# Patient Record
Sex: FEMALE | Race: OTHER | URBAN - METROPOLITAN AREA
[De-identification: names, ages, dates, MRNs, and addresses within clinical notes are randomized per-mention and may not be internally consistent; named-entity substitution may affect disease eponyms.]

---

## 2017-08-07 ENCOUNTER — IMPORTED ENCOUNTER (OUTPATIENT)
Dept: URBAN - METROPOLITAN AREA CLINIC 9 | Facility: CLINIC | Age: 82
End: 2017-08-07

## 2018-10-18 ENCOUNTER — IMPORTED ENCOUNTER (OUTPATIENT)
Dept: URBAN - METROPOLITAN AREA CLINIC 9 | Facility: CLINIC | Age: 83
End: 2018-10-18

## 2018-12-20 NOTE — PATIENT DISCUSSION
Pt edu edema improved from a year ago. However pt feels that [place] is decreased since last visit. Injection vs Topical treatment discussed. Recommend least invasive first. Recommend starting BromSite 1 gtt Qday and will follow back up in 4 weeks. Advised pt to call with any vision changes.

## 2018-12-20 NOTE — PATIENT DISCUSSION
Pt edu stable since last visit. Recommended against surgery at this time given that patient is happy with present vision.

## 2019-10-08 NOTE — PATIENT DISCUSSION
Worsening edema today but not affecting vision at this point. Treatment not recommended at this time.  Recommended observation.  Advised pt to call with any vision changes.

## 2019-10-31 ENCOUNTER — IMPORTED ENCOUNTER (OUTPATIENT)
Dept: URBAN - METROPOLITAN AREA CLINIC 9 | Facility: CLINIC | Age: 84
End: 2019-10-31

## 2020-01-10 NOTE — PATIENT DISCUSSION
Improving edema today. Treatment not needed at this time.  Recommended observation.  Advised pt to call with any vision changes.

## 2020-10-13 NOTE — PATIENT DISCUSSION
Pt states symptoms have resolved. Recommend pt continue with Pataday 1 gtt Qday/PRN Will continue to monitor.

## 2020-10-26 ENCOUNTER — IMPORTED ENCOUNTER (OUTPATIENT)
Dept: URBAN - METROPOLITAN AREA CLINIC 9 | Facility: CLINIC | Age: 85
End: 2020-10-26

## 2020-10-26 PROBLEM — H04.123: Noted: 2020-10-26

## 2020-10-26 PROBLEM — H43.813: Noted: 2020-10-26

## 2020-10-26 PROBLEM — E11.9: Noted: 2020-10-26

## 2020-10-26 PROBLEM — H26.493: Noted: 2020-10-26

## 2021-04-13 NOTE — PATIENT DISCUSSION
Trace edema today. Treatment not needed at this time.  Recommended observation.  Advised pt to call with any vision changes.

## 2021-10-17 ASSESSMENT — VISUAL ACUITY
OS_CC: 20/25 SN
OS_SC: 20/400 SN
OD_SC: 20/30 -2 SN
OD_PH: 20/25 -2 SN
OS_CC: 20/20 SN
OS_SC: 20/400 SN
OS_PH: 20/25 SN
OS_SC: 20/400 SN
OS_CC: 20/25 - SN
OD_SC: 20/40 - SN
OD_SC: 20/30 - SN
OD_SC: 20/30 + SN
OD_CC: 20/25 + SN
OD_CC: 20/20 SN
OD_CC: 20/20 -2 SN

## 2021-10-17 ASSESSMENT — KERATOMETRY
OS_K1POWER_DIOPTERS: 43
OS_K1POWER_DIOPTERS: 43.25
OD_K1POWER_DIOPTERS: 7.875
OD_K2POWER_DIOPTERS: 43.5
OS_K1POWER_DIOPTERS: 43.25
OS_K2POWER_DIOPTERS: 43.5
OD_K1POWER_DIOPTERS: 43
OD_AXISANGLE2_DEGREES: 16
OS_AXISANGLE_DEGREES: 147
OD_AXISANGLE_DEGREES: 106
OD_AXISANGLE2_DEGREES: 7
OS_K1POWER_DIOPTERS: 7.875
OD_AXISANGLE2_DEGREES: 18
OS_AXISANGLE2_DEGREES: 165
OS_AXISANGLE_DEGREES: 180
OS_AXISANGLE2_DEGREES: 79
OS_AXISANGLE2_DEGREES: 90
OD_K2POWER_DIOPTERS: 7.75
OD_AXISANGLE_DEGREES: 97
OD_K1POWER_DIOPTERS: 42.75
OD_K2POWER_DIOPTERS: 43.25
OS_AXISANGLE_DEGREES: 169
OD_K2POWER_DIOPTERS: 43.25
OD_K1POWER_DIOPTERS: 43
OS_AXISANGLE2_DEGREES: 57
OS_K2POWER_DIOPTERS: 7.75
OS_K2POWER_DIOPTERS: 43.25
OD_AXISANGLE_DEGREES: 108
OS_K2POWER_DIOPTERS: 43.5
OS_AXISANGLE_DEGREES: 75
OD_AXISANGLE_DEGREES: 107
OD_AXISANGLE2_DEGREES: 17

## 2021-10-17 ASSESSMENT — TONOMETRY
OS_IOP_MMHG: 10
OD_IOP_MMHG: 13
OD_IOP_MMHG: 11
OD_IOP_MMHG: 11
OS_IOP_MMHG: 12
OS_IOP_MMHG: 9
OS_IOP_MMHG: 12
OD_IOP_MMHG: 9

## 2022-02-14 ENCOUNTER — ESTABLISHED PATIENT (OUTPATIENT)
Dept: URBAN - METROPOLITAN AREA CLINIC 9 | Facility: CLINIC | Age: 87
End: 2022-02-14

## 2022-02-14 DIAGNOSIS — H35.363: ICD-10-CM

## 2022-02-14 DIAGNOSIS — H52.223: ICD-10-CM

## 2022-02-14 DIAGNOSIS — H26.493: ICD-10-CM

## 2022-02-14 PROCEDURE — 92015 DETERMINE REFRACTIVE STATE: CPT

## 2022-02-14 PROCEDURE — 92134 CPTRZ OPH DX IMG PST SGM RTA: CPT

## 2022-02-14 PROCEDURE — 92014 COMPRE OPH EXAM EST PT 1/>: CPT

## 2022-02-14 ASSESSMENT — KERATOMETRY
OD_AXISANGLE2_DEGREES: 10
OD_AXISANGLE_DEGREES: 100
OS_K2POWER_DIOPTERS: 43.25
OS_AXISANGLE2_DEGREES: 11
OS_AXISANGLE_DEGREES: 101
OD_K1POWER_DIOPTERS: 42.50
OS_K1POWER_DIOPTERS: 43.00
OD_K2POWER_DIOPTERS: 43.25

## 2022-02-14 ASSESSMENT — TONOMETRY
OD_IOP_MMHG: 11
OS_IOP_MMHG: 12

## 2022-02-14 ASSESSMENT — VISUAL ACUITY
OD_SC: 20/50-1
OU_SC: J1+
OS_GLARE: 20/400
OD_GLARE: 20/40
OU_SC: 20/40-1
OS_SC: J1+

## 2022-05-24 NOTE — PATIENT DISCUSSION
Edema resolved today. Treatment not needed at this time.  Recommended observation.  Advised pt to call with any vision changes.

## 2022-06-17 RX ORDER — DOXYCYCLINE HYCLATE 20 MG
TABLET ORAL
COMMUNITY

## 2022-06-17 RX ORDER — RISEDRONATE SODIUM 35 MG/1
TABLET, FILM COATED ORAL
COMMUNITY
End: 2022-10-03

## 2022-06-17 RX ORDER — CARVEDILOL 6.25 MG/1
TABLET ORAL
COMMUNITY
End: 2022-10-03

## 2022-06-17 RX ORDER — METFORMIN HYDROCHLORIDE 500 MG/1
TABLET, EXTENDED RELEASE ORAL
COMMUNITY
Start: 2022-03-16 | End: 2022-10-03

## 2022-06-17 RX ORDER — CELECOXIB 200 MG/1
CAPSULE ORAL
COMMUNITY
End: 2022-08-11 | Stop reason: SDUPTHER

## 2022-06-17 RX ORDER — ATORVASTATIN CALCIUM 80 MG/1
TABLET, FILM COATED ORAL
COMMUNITY
End: 2022-10-03

## 2022-06-17 RX ORDER — GABAPENTIN 100 MG/1
CAPSULE ORAL
COMMUNITY
End: 2022-08-24

## 2022-08-10 PROBLEM — G89.29 OTHER CHRONIC PAIN: Status: ACTIVE | Noted: 2022-08-10

## 2022-08-19 PROBLEM — I51.81 TAKOTSUBO CARDIOMYOPATHY: Status: ACTIVE | Noted: 2022-08-19

## 2022-08-19 PROBLEM — S49.92XA INJURY OF LEFT SHOULDER: Status: ACTIVE | Noted: 2022-08-19

## 2022-08-19 PROBLEM — M35.3 POLYMYALGIA RHEUMATICA (HCC): Status: ACTIVE | Noted: 2022-08-19

## 2022-08-19 PROBLEM — M19.90 OSTEOARTHROSIS: Status: ACTIVE | Noted: 2022-08-19

## 2022-08-19 PROBLEM — I63.9 CEREBRAL INFARCTION (HCC): Status: ACTIVE | Noted: 2022-08-19

## 2022-08-19 PROBLEM — I50.21 ACUTE SYSTOLIC CONGESTIVE HEART FAILURE (HCC): Status: ACTIVE | Noted: 2022-08-19

## 2022-08-19 PROBLEM — K57.32 DIVERTICULITIS OF LARGE INTESTINE WITHOUT PERFORATION OR ABSCESS WITHOUT BLEEDING: Status: ACTIVE | Noted: 2022-08-19

## 2022-08-19 PROBLEM — M76.899 ENTHESOPATHY OF HIP REGION: Status: ACTIVE | Noted: 2022-08-19

## 2022-08-19 PROBLEM — M75.40 SHOULDER IMPINGEMENT SYNDROME: Status: ACTIVE | Noted: 2022-08-19

## 2022-08-19 PROBLEM — M75.101 TEAR OF RIGHT ROTATOR CUFF: Status: ACTIVE | Noted: 2022-08-19

## 2022-08-19 PROBLEM — K59.00 CONSTIPATION: Status: ACTIVE | Noted: 2022-08-19

## 2022-08-19 PROBLEM — R13.14 PHARYNGOESOPHAGEAL DYSPHAGIA: Status: ACTIVE | Noted: 2022-08-19

## 2022-08-19 PROBLEM — Z96.652 HISTORY OF LEFT KNEE REPLACEMENT: Status: ACTIVE | Noted: 2022-08-19

## 2022-08-19 PROBLEM — J32.4 CHRONIC PANSINUSITIS: Status: ACTIVE | Noted: 2022-08-19

## 2022-08-19 PROBLEM — J31.0 CHRONIC RHINITIS: Status: ACTIVE | Noted: 2022-08-19

## 2022-08-19 PROBLEM — M65.30 TRIGGER FINGER: Status: ACTIVE | Noted: 2022-08-19

## 2022-08-19 PROBLEM — M81.0 AGE-RELATED OSTEOPOROSIS WITHOUT CURRENT PATHOLOGICAL FRACTURE: Status: ACTIVE | Noted: 2022-08-19

## 2022-08-19 PROBLEM — E78.5 HYPERLIPIDEMIA: Status: ACTIVE | Noted: 2022-08-19

## 2022-08-19 PROBLEM — A60.04 HERPES SIMPLEX VULVOVAGINITIS: Status: ACTIVE | Noted: 2022-08-19

## 2022-08-19 PROBLEM — E11.9 TYPE 2 DIABETES MELLITUS WITHOUT COMPLICATION, WITHOUT LONG-TERM CURRENT USE OF INSULIN (HCC): Status: ACTIVE | Noted: 2022-08-19

## 2022-08-19 PROBLEM — M43.16 SPONDYLOLISTHESIS, LUMBAR REGION: Status: ACTIVE | Noted: 2022-08-19

## 2022-08-19 PROBLEM — M17.12 PRIMARY OSTEOARTHRITIS OF LEFT KNEE: Status: ACTIVE | Noted: 2022-08-19

## 2022-08-19 PROBLEM — I10 ESSENTIAL HYPERTENSION: Status: ACTIVE | Noted: 2022-08-19

## 2022-08-19 PROBLEM — Z96.651 HISTORY OF TOTAL RIGHT KNEE REPLACEMENT: Status: ACTIVE | Noted: 2022-08-19

## 2022-08-24 PROBLEM — E78.00 HYPERCHOLESTEROLEMIA: Status: ACTIVE | Noted: 2022-08-24

## 2022-08-24 PROBLEM — E11.9 DIABETES MELLITUS (HCC): Status: ACTIVE | Noted: 2022-08-24

## 2022-08-24 PROBLEM — I21.9 MYOCARDIAL INFARCTION (HCC): Status: ACTIVE | Noted: 2022-08-24

## 2022-10-03 PROBLEM — M54.42 CHRONIC LEFT-SIDED LOW BACK PAIN WITH LEFT-SIDED SCIATICA: Status: ACTIVE | Noted: 2022-10-03

## 2022-10-03 PROBLEM — M84.48XD BILATERAL SACRAL INSUFFICIENCY FRACTURE WITH ROUTINE HEALING: Status: ACTIVE | Noted: 2022-10-03

## 2022-10-03 PROBLEM — M54.16 LEFT LUMBAR RADICULITIS: Status: ACTIVE | Noted: 2022-10-03

## 2022-10-03 PROBLEM — G89.29 CHRONIC LEFT-SIDED LOW BACK PAIN WITH LEFT-SIDED SCIATICA: Status: ACTIVE | Noted: 2022-10-03

## 2022-10-03 PROBLEM — M79.2 NERVE PAIN: Status: ACTIVE | Noted: 2022-10-03

## 2022-10-03 PROBLEM — C51.9 VULVAR CANCER (HCC): Status: ACTIVE | Noted: 2022-10-03

## 2023-03-29 NOTE — PATIENT DISCUSSION
No retinal detachment or retinal tear noted. [FreeTextEntry1] : 52 year female who comes for Cardiology evaluation of palpitations. she is aware of a history of a murmur. She notes having newly diagnosed hypertension and pre-DM. She plans to lose weight and is on medications. She recalls a history of a murmur and told it did change or worsen. She notes having palpitations starting 3 years ago. She was hospitalized with Covid at the time (with first wave). She was vaccinated since. She notes walking miles daily without symptoms but has SOB on stairs at the same time that palpitations began. Her SOB is worse when her weight is elevated. She has heart burn and left sided arm burning and intermittent burning at her left upper chest. She denies having any SOB, dizziness, orthopnea, PND or syncope

## 2024-10-01 NOTE — PATIENT DISCUSSION
Advised regular use of Amsler grid. Patient is requesting a refill of Nurtec. It isn't showing under current meds report, but when searching for it, it is there, just .     LOV: 2024  Next visit: 2025

## 2025-06-16 NOTE — PATIENT DISCUSSION
Advised regular use of Amsler grid.
Doing well, retina attached, good IOP.
Doing well.
Membrane is not visually significant.
No retinal detachment or retinal tear noted.
Patient elects observation.
Recommended against surgery at this time given that patient is happy with present vision.
Recommended observation.
Retinal detachment warnings given.
There is no recurrence of intraretinal or subretinal fluid on spectral domain OCT today.
Vitreous opacities are not visually significant.
DISPLAY PLAN FREE TEXT